# Patient Record
Sex: MALE | Race: WHITE | NOT HISPANIC OR LATINO | Employment: OTHER | URBAN - NONMETROPOLITAN AREA
[De-identification: names, ages, dates, MRNs, and addresses within clinical notes are randomized per-mention and may not be internally consistent; named-entity substitution may affect disease eponyms.]

---

## 2017-09-14 ENCOUNTER — OFFICE VISIT (OUTPATIENT)
Dept: RETAIL CLINIC | Facility: CLINIC | Age: 82
End: 2017-09-14

## 2017-09-14 DIAGNOSIS — Z23 NEED FOR VACCINATION: Primary | ICD-10-CM

## 2018-09-17 ENCOUNTER — TRANSCRIBE ORDERS (OUTPATIENT)
Dept: ADMINISTRATIVE | Facility: HOSPITAL | Age: 83
End: 2018-09-17

## 2018-09-17 DIAGNOSIS — I48.20 CHRONIC ATRIAL FIBRILLATION (HCC): Primary | ICD-10-CM

## 2018-09-18 ENCOUNTER — HOSPITAL ENCOUNTER (OUTPATIENT)
Dept: CARDIOLOGY | Facility: HOSPITAL | Age: 83
Discharge: HOME | End: 2018-09-18
Admitting: NURSE PRACTITIONER

## 2018-09-18 DIAGNOSIS — I48.20 CHRONIC ATRIAL FIBRILLATION (HCC): ICD-10-CM

## 2018-09-18 PROCEDURE — 93226 XTRNL ECG REC<48 HR SCAN A/R: CPT

## 2018-09-18 PROCEDURE — 93225 XTRNL ECG REC<48 HRS REC: CPT

## 2018-09-25 PROCEDURE — 93227 XTRNL ECG REC<48 HR R&I: CPT | Performed by: INTERNAL MEDICINE

## 2018-10-17 RX ORDER — GLIPIZIDE 5 MG/1
5 TABLET ORAL
COMMUNITY

## 2018-10-17 RX ORDER — LEVOTHYROXINE SODIUM 0.05 MG/1
50 TABLET ORAL DAILY
COMMUNITY

## 2018-10-18 ENCOUNTER — OFFICE VISIT (OUTPATIENT)
Dept: CARDIOLOGY | Facility: CLINIC | Age: 83
End: 2018-10-18

## 2018-10-18 VITALS
DIASTOLIC BLOOD PRESSURE: 84 MMHG | OXYGEN SATURATION: 80 % | BODY MASS INDEX: 26.16 KG/M2 | HEART RATE: 61 BPM | HEIGHT: 65 IN | WEIGHT: 157 LBS | SYSTOLIC BLOOD PRESSURE: 130 MMHG

## 2018-10-18 DIAGNOSIS — I48.0 PAROXYSMAL ATRIAL FIBRILLATION (HCC): Primary | ICD-10-CM

## 2018-10-18 DIAGNOSIS — R06.09 DOE (DYSPNEA ON EXERTION): ICD-10-CM

## 2018-10-18 DIAGNOSIS — E07.9 DISEASE OF THYROID GLAND: ICD-10-CM

## 2018-10-18 PROBLEM — I48.91 ATRIAL FIBRILLATION (HCC): Status: ACTIVE | Noted: 2018-10-18

## 2018-10-18 PROCEDURE — 99204 OFFICE O/P NEW MOD 45 MIN: CPT | Performed by: INTERNAL MEDICINE

## 2018-10-18 PROCEDURE — 93000 ELECTROCARDIOGRAM COMPLETE: CPT | Performed by: INTERNAL MEDICINE

## 2018-10-18 RX ORDER — FERROUS SULFATE TAB EC 324 MG (65 MG FE EQUIVALENT) 324 (65 FE) MG
324 TABLET DELAYED RESPONSE ORAL
COMMUNITY

## 2018-10-18 RX ORDER — ASPIRIN 81 MG/1
81 TABLET ORAL DAILY
COMMUNITY
End: 2018-10-18

## 2018-10-18 RX ORDER — THIAMINE HCL 100 MG
2500 TABLET ORAL DAILY
COMMUNITY

## 2018-10-18 NOTE — PROGRESS NOTES
Subjective:     Encounter Date:10/18/2018      Patient ID: Hunter Chen is a 88 y.o. male history of recently diagnosed atrial fibrillation, type II diabetes mellitus, hypothyroidism, hyperlipidemia is referred for further evaluation of atrial fibrillation.    Referring Provider: Pablo Pemberton APRN    Reason for Referral: Atrial fibrillation    Chief Complaint: Shortness of breath    Atrial Fibrillation   Presents for initial visit. Onset time: recently diagnosed. Symptoms include shortness of breath. Symptoms are negative for chest pain, dizziness, palpitations, syncope, tachycardia and weakness. The symptoms have been stable. Past treatments include nothing. Past medical history includes atrial fibrillation and hyperlipidemia. There is no history of CABG/stent, CAD, CHF, DVT, HTN and valvular heart disease.   Shortness of Breath   This is a new problem. The current episode started more than 1 month ago. The problem occurs daily. The problem has been waxing and waning. Pertinent negatives include no abdominal pain, chest pain, claudication, fever, headaches, hemoptysis, leg swelling, neck pain, orthopnea, PND, rash, syncope, vomiting or wheezing. The symptoms are aggravated by exercise. The patient has no known risk factors for DVT/PE. He has tried nothing for the symptoms. There is no history of CAD, chronic lung disease, COPD, DVT, a heart failure, PE, pneumonia or a recent surgery.        This is an 88-year-old male who presents for further evaluation of atrial fibrillation.  This was recently diagnosed by his primary care provider.  The patient says that for quite some time he has been somewhat short of breath with exertion.  When he discussed this with his primary care physician, an EKG was performed and showed the patient to be in atrial fibrillation.  He denies any palpitations, lightheadedness, dizziness, syncope, chest pain.  He has never been known to have any cardiac arrhythmias in the past.  He  says that overall he has been feeling reasonably well with the exception of what he describes as mild to moderate shortness of breath with exertion.  He denies orthopnea, PND, edema, cough, wheezing.  His weight has been stable.  He has never been known to have any type of valvular heart disease although he says he cannot recall ever having an echocardiogram performed.  The patient has not, to his knowledge had recent check of his thyroid function although he does take Synthroid on a regular basis.  He says that his diabetes is under reasonable control.  His blood pressure generally is well-controlled as well he does not require medications for this.  He has not had any significant bleeding difficulties in the past and has not fallen down at any point recently.    The following portions of the patient's history were reviewed and updated as appropriate: allergies, current medications, past family history, past medical history, past social history, past surgical history and problem list.     Past Medical History:   Diagnosis Date   • Atrial fibrillation (CMS/HCC)    • Diabetes mellitus (CMS/HCC)    • Disease of thyroid gland     HYPOTHYROIDISM   • Hyperlipidemia      Past Surgical History:   Procedure Laterality Date   • CYSTOSCOPY, STENT INSERTION, NEPHROURETERECTOMY         Current Outpatient Prescriptions:   •  ferrous sulfate 324 (65 Fe) MG tablet delayed-release EC tablet, Take 324 mg by mouth Daily With Breakfast., Disp: , Rfl:   •  glipiZIDE (GLUCOTROL) 5 MG tablet, Take 5 mg by mouth 3 (Three) Times a Day Before Meals., Disp: , Rfl:   •  levothyroxine (SYNTHROID, LEVOTHROID) 50 MCG tablet, Take 50 mcg by mouth Daily., Disp: , Rfl:   •  NIACIN CR PO, Take 1 tablet by mouth Daily., Disp: , Rfl:   •  vitamin B-12 (CYANOCOBALAMIN) 2500 MCG sublingual tablet tablet, Place 2,500 mcg under the tongue Daily., Disp: , Rfl:   •  apixaban (ELIQUIS) 2.5 MG tablet tablet, Take 1 tablet by mouth Every 12 (Twelve) Hours.,  Disp: 60 tablet, Rfl: 11    No Known Allergies    Social History   Substance Use Topics   • Smoking status: Former Smoker     Quit date: 1962   • Smokeless tobacco: Never Used      Comment: QUIT 50 YRS AGO   • Alcohol use No     Family History   Problem Relation Age of Onset   • No Known Problems Mother    • No Known Problems Father    • No Known Problems Sister    • No Known Problems Brother    • No Known Problems Brother    • No Known Problems Brother    • No Known Problems Brother    • No Known Problems Brother      Review of Systems   Constitution: Negative for chills, fever, weakness, night sweats and weight loss.   HENT: Negative for congestion and hearing loss.    Eyes: Negative for blurred vision and pain.   Cardiovascular: Positive for dyspnea on exertion. Negative for chest pain, claudication, irregular heartbeat, leg swelling, orthopnea, palpitations, paroxysmal nocturnal dyspnea and syncope.   Respiratory: Positive for shortness of breath. Negative for cough, hemoptysis and wheezing.    Endocrine: Negative for cold intolerance, heat intolerance, polydipsia and polyuria.   Hematologic/Lymphatic: Negative for adenopathy and bleeding problem. Does not bruise/bleed easily.   Skin: Negative for color change, poor wound healing and rash.   Musculoskeletal: Negative for arthritis, back pain, joint pain, joint swelling, myalgias and neck pain.   Gastrointestinal: Negative for abdominal pain, change in bowel habit, constipation, diarrhea, heartburn, hematochezia, melena, nausea and vomiting.   Genitourinary: Negative for bladder incontinence, dysuria, frequency, hematuria and nocturia.   Neurological: Negative for dizziness, focal weakness, headaches, light-headedness, loss of balance, numbness and seizures.   Psychiatric/Behavioral: Negative for altered mental status, memory loss and substance abuse.   Allergic/Immunologic: Negative for hives and persistent infections.         ECG 12 Lead  Date/Time: 10/18/2018  8:28 AM  Performed by: BRUCE SEXTON  Authorized by: BRUCE SEXTON   Comparison: compared with previous ECG from 9/17/2018  Similar to previous ECG  Rhythm: atrial fibrillation  Ectopy: PVCs  Rate: tachycardic  BPM: 114  Conduction: right bundle branch block  QRS axis: left  Clinical impression: abnormal ECG and dysrhythmia - atrial               Objective:     Physical Exam   Constitutional: He is oriented to person, place, and time. Vital signs are normal. He appears well-developed and well-nourished. He is cooperative.  Non-toxic appearance. No distress.   HENT:   Head: Normocephalic and atraumatic.   Right Ear: External ear normal.   Left Ear: External ear normal.   Nose: Nose normal.   Mouth/Throat: Uvula is midline, oropharynx is clear and moist and mucous membranes are normal. Mucous membranes are not pale, not dry and not cyanotic. No oropharyngeal exudate.   Eyes: Pupils are equal, round, and reactive to light. EOM and lids are normal.   Neck: Normal range of motion. Neck supple. No hepatojugular reflux and no JVD present. Carotid bruit is not present. No tracheal deviation and no edema present. No thyroid mass and no thyromegaly present.   Cardiovascular: Normal rate, S1 normal, S2 normal, normal heart sounds, intact distal pulses and normal pulses.  An irregularly irregular rhythm present.  No extrasystoles are present. PMI is not displaced.  Exam reveals no gallop and no friction rub.    No murmur heard.  Pulses:       Radial pulses are 2+ on the right side, and 2+ on the left side.        Femoral pulses are 2+ on the right side, and 2+ on the left side.       Dorsalis pedis pulses are 2+ on the right side, and 2+ on the left side.        Posterior tibial pulses are 2+ on the right side, and 2+ on the left side.   Pulmonary/Chest: Effort normal and breath sounds normal. No accessory muscle usage. No respiratory distress. He has no wheezes. He has no rales. He exhibits no tenderness.  "  Abdominal: Soft. Normal appearance and bowel sounds are normal. He exhibits no distension, no abdominal bruit and no pulsatile midline mass. There is no hepatosplenomegaly. There is no tenderness.   Musculoskeletal: Normal range of motion. He exhibits no edema, tenderness or deformity.   Lymphadenopathy:     He has no cervical adenopathy.   Neurological: He is oriented to person, place, and time. He has normal strength. No cranial nerve deficit.   Skin: Skin is warm, dry and intact. No rash noted. He is not diaphoretic. No cyanosis or erythema. Nails show no clubbing.   Psychiatric: He has a normal mood and affect. His speech is normal and behavior is normal. Thought content normal.   Vitals reviewed.    /84 (BP Location: Left arm, Patient Position: Sitting)   Pulse 61   Ht 165.1 cm (65\")   Wt 71.2 kg (157 lb)   SpO2 (!) 80%   BMI 26.13 kg/m²     Lab Review:     Holter 9/18/18:  · An abnormal monitor study.  · The predominant rhythm noted during the testing period was atrial fibrillation.  · Average HR: 92. Min HR: 59. Max HR: 154.  · Premature ventricular contractions occured rarely.  · No symptoms reported during the monitoring period.      Assessment:          Diagnosis Plan   1. Paroxysmal atrial fibrillation (CMS/HCC)  ECG 12 Lead    Adult Transthoracic Echo Complete W/ Cont if Necessary Per Protocol    Basic Metabolic Panel    Cardioversion External in Cardiology Department    apixaban (ELIQUIS) 2.5 MG tablet tablet   2. Disease of thyroid gland  TSH   3. UNGER (dyspnea on exertion)            Plan:       1.  Paroxysmal atrial fibrillation: This was recently diagnosed.  Presumably this is paroxysmal in nature but probably has been present for least a month as the patient has been experiencing some shortness of breath that was new onset for him over the past month or so.  His EKG today did show atrial fibrillation that is rate controlled.  I will check an echocardiogram to assure no significant " valvular abnormalities.  In addition, we will place the patient on Eliquis at 2.5 mg twice daily.  He recently had a creatinine level of 1.17 at our institution but has not had any recent checks.  It may be that his renal function has normalized and he may require a 5 mg dose but we will make further plans after his basic metabolic panel returns.  I will also go and schedule a cardioversion in approximately 4 weeks more we will try to restore sinus rhythm for this patient.  Afterwards, we will readdress shortness of breath.  Presumably, shortness of breath is related to his atrial fibrillation.    2.  Acquired hypothyroidism: The patient tells me that he is not sure but he does not think that he has had recent check of his thyroid function therefore I will check a TSH level today to see where he is in this regard.  Continue Synthroid at this time.    3.  Shortness of breath: As stated above, presumably the patient shortness of breath is related to his atrial fibrillation.  We will try to restore sinus rhythm and readdress his shortness of breath thereafter worse.    Patient's Body mass index is 26.13 kg/m². BMI is above normal parameters. Recommendations include: exercise counseling and nutrition counseling.    Follow-up: pending above

## 2018-10-25 ENCOUNTER — HOSPITAL ENCOUNTER (OUTPATIENT)
Dept: CARDIOLOGY | Facility: HOSPITAL | Age: 83
Discharge: HOME | End: 2018-10-25
Attending: INTERNAL MEDICINE | Admitting: INTERNAL MEDICINE

## 2018-10-25 VITALS
WEIGHT: 156.97 LBS | BODY MASS INDEX: 26.15 KG/M2 | DIASTOLIC BLOOD PRESSURE: 85 MMHG | HEIGHT: 65 IN | SYSTOLIC BLOOD PRESSURE: 149 MMHG

## 2018-10-25 DIAGNOSIS — I48.0 PAROXYSMAL ATRIAL FIBRILLATION (HCC): ICD-10-CM

## 2018-10-25 PROCEDURE — 93306 TTE W/DOPPLER COMPLETE: CPT | Performed by: INTERNAL MEDICINE

## 2018-10-25 PROCEDURE — 93306 TTE W/DOPPLER COMPLETE: CPT

## 2018-10-25 PROCEDURE — 25010000002 PERFLUTREN 6.52 MG/ML SUSPENSION: Performed by: INTERNAL MEDICINE

## 2018-10-25 RX ADMIN — PERFLUTREN 8.48 MG: 6.52 INJECTION, SUSPENSION INTRAVENOUS at 13:23

## 2018-10-29 LAB
BH CV ECHO MEAS - AO MAX PG (FULL): 1 MMHG
BH CV ECHO MEAS - AO MAX PG: 3.3 MMHG
BH CV ECHO MEAS - AO MEAN PG (FULL): 0 MMHG
BH CV ECHO MEAS - AO MEAN PG: 2 MMHG
BH CV ECHO MEAS - AO ROOT AREA (BSA CORRECTED): 1.7
BH CV ECHO MEAS - AO ROOT AREA: 7.1 CM^2
BH CV ECHO MEAS - AO ROOT DIAM: 3 CM
BH CV ECHO MEAS - AO V2 MAX: 91.5 CM/SEC
BH CV ECHO MEAS - AO V2 MEAN: 68.4 CM/SEC
BH CV ECHO MEAS - AO V2 VTI: 17.9 CM
BH CV ECHO MEAS - AVA(I,A): 2.9 CM^2
BH CV ECHO MEAS - AVA(I,D): 2.9 CM^2
BH CV ECHO MEAS - AVA(V,A): 2.6 CM^2
BH CV ECHO MEAS - AVA(V,D): 2.6 CM^2
BH CV ECHO MEAS - BSA(HAYCOCK): 1.8 M^2
BH CV ECHO MEAS - BSA: 1.8 M^2
BH CV ECHO MEAS - BZI_BMI: 26 KILOGRAMS/M^2
BH CV ECHO MEAS - BZI_METRIC_HEIGHT: 165.1 CM
BH CV ECHO MEAS - BZI_METRIC_WEIGHT: 70.8 KG
BH CV ECHO MEAS - EDV(CUBED): 132.7 ML
BH CV ECHO MEAS - EDV(MOD-SP4): 91.2 ML
BH CV ECHO MEAS - EDV(TEICH): 123.8 ML
BH CV ECHO MEAS - EF(CUBED): 55.3 %
BH CV ECHO MEAS - EF(MOD-SP4): 52.9 %
BH CV ECHO MEAS - EF(TEICH): 46.8 %
BH CV ECHO MEAS - ESV(CUBED): 59.3 ML
BH CV ECHO MEAS - ESV(MOD-SP4): 43 ML
BH CV ECHO MEAS - ESV(TEICH): 65.9 ML
BH CV ECHO MEAS - FS: 23.5 %
BH CV ECHO MEAS - IVS/LVPW: 0.91
BH CV ECHO MEAS - IVSD: 1 CM
BH CV ECHO MEAS - LA DIMENSION: 4.5 CM
BH CV ECHO MEAS - LA/AO: 1.5
BH CV ECHO MEAS - LAT PEAK E' VEL: 14.1 CM/SEC
BH CV ECHO MEAS - LV DIASTOLIC VOL/BSA (35-75): 51.2 ML/M^2
BH CV ECHO MEAS - LV MASS(C)D: 200.8 GRAMS
BH CV ECHO MEAS - LV MASS(C)DI: 112.8 GRAMS/M^2
BH CV ECHO MEAS - LV MAX PG: 2.3 MMHG
BH CV ECHO MEAS - LV MEAN PG: 2 MMHG
BH CV ECHO MEAS - LV SYSTOLIC VOL/BSA (12-30): 24.2 ML/M^2
BH CV ECHO MEAS - LV V1 MAX: 75.6 CM/SEC
BH CV ECHO MEAS - LV V1 MEAN: 61.4 CM/SEC
BH CV ECHO MEAS - LV V1 VTI: 16.8 CM
BH CV ECHO MEAS - LVIDD: 5.1 CM
BH CV ECHO MEAS - LVIDS: 3.9 CM
BH CV ECHO MEAS - LVLD AP4: 7.3 CM
BH CV ECHO MEAS - LVLS AP4: 6.1 CM
BH CV ECHO MEAS - LVOT AREA (M): 3.1 CM^2
BH CV ECHO MEAS - LVOT AREA: 3.1 CM^2
BH CV ECHO MEAS - LVOT DIAM: 2 CM
BH CV ECHO MEAS - LVPWD: 1.1 CM
BH CV ECHO MEAS - MED PEAK E' VEL: 23.1 CM/SEC
BH CV ECHO MEAS - MV A MAX VEL: 32.8 CM/SEC
BH CV ECHO MEAS - MV DEC TIME: 0.19 SEC
BH CV ECHO MEAS - MV E MAX VEL: 95.2 CM/SEC
BH CV ECHO MEAS - MV E/A: 2.9
BH CV ECHO MEAS - RAP SYSTOLE: 5 MMHG
BH CV ECHO MEAS - RVSP: 52.1 MMHG
BH CV ECHO MEAS - SI(AO): 71.1 ML/M^2
BH CV ECHO MEAS - SI(CUBED): 41.2 ML/M^2
BH CV ECHO MEAS - SI(LVOT): 29.7 ML/M^2
BH CV ECHO MEAS - SI(MOD-SP4): 27.1 ML/M^2
BH CV ECHO MEAS - SI(TEICH): 32.5 ML/M^2
BH CV ECHO MEAS - SV(AO): 126.5 ML
BH CV ECHO MEAS - SV(CUBED): 73.3 ML
BH CV ECHO MEAS - SV(LVOT): 52.8 ML
BH CV ECHO MEAS - SV(MOD-SP4): 48.2 ML
BH CV ECHO MEAS - SV(TEICH): 57.9 ML
BH CV ECHO MEAS - TR MAX VEL: 343 CM/SEC
BH CV ECHO MEASUREMENTS AVERAGE E/E' RATIO: 5.12
LEFT ATRIUM VOLUME INDEX: 35.1 ML/M2
LEFT ATRIUM VOLUME: 62.5 CM3

## 2018-11-19 ENCOUNTER — HOSPITAL ENCOUNTER (OUTPATIENT)
Dept: CARDIOLOGY | Facility: HOSPITAL | Age: 83
Discharge: HOME | End: 2018-11-19
Attending: INTERNAL MEDICINE | Admitting: INTERNAL MEDICINE

## 2018-11-19 ENCOUNTER — ANESTHESIA (OUTPATIENT)
Dept: CARDIOLOGY | Facility: HOSPITAL | Age: 83
End: 2018-11-19

## 2018-11-19 ENCOUNTER — ANESTHESIA EVENT (OUTPATIENT)
Dept: CARDIOLOGY | Facility: HOSPITAL | Age: 83
End: 2018-11-19

## 2018-11-19 VITALS
OXYGEN SATURATION: 97 % | RESPIRATION RATE: 20 BRPM | BODY MASS INDEX: 25.33 KG/M2 | WEIGHT: 152 LBS | SYSTOLIC BLOOD PRESSURE: 151 MMHG | HEIGHT: 65 IN | DIASTOLIC BLOOD PRESSURE: 98 MMHG | HEART RATE: 86 BPM

## 2018-11-19 VITALS — DIASTOLIC BLOOD PRESSURE: 80 MMHG | SYSTOLIC BLOOD PRESSURE: 121 MMHG | OXYGEN SATURATION: 99 %

## 2018-11-19 DIAGNOSIS — I48.0 PAROXYSMAL ATRIAL FIBRILLATION (HCC): ICD-10-CM

## 2018-11-19 PROCEDURE — 93005 ELECTROCARDIOGRAM TRACING: CPT | Performed by: INTERNAL MEDICINE

## 2018-11-19 PROCEDURE — 25010000002 PROPOFOL 10 MG/ML EMULSION: Performed by: NURSE ANESTHETIST, CERTIFIED REGISTERED

## 2018-11-19 PROCEDURE — 92960 CARDIOVERSION ELECTRIC EXT: CPT | Performed by: INTERNAL MEDICINE

## 2018-11-19 PROCEDURE — 93010 ELECTROCARDIOGRAM REPORT: CPT | Performed by: INTERNAL MEDICINE

## 2018-11-19 PROCEDURE — 92960 CARDIOVERSION ELECTRIC EXT: CPT

## 2018-11-19 RX ORDER — SODIUM CHLORIDE 9 MG/ML
100 INJECTION, SOLUTION INTRAVENOUS CONTINUOUS
Status: DISCONTINUED | OUTPATIENT
Start: 2018-11-19 | End: 2018-11-20 | Stop reason: HOSPADM

## 2018-11-19 RX ORDER — PROPOFOL 10 MG/ML
VIAL (ML) INTRAVENOUS AS NEEDED
Status: DISCONTINUED | OUTPATIENT
Start: 2018-11-19 | End: 2018-11-19 | Stop reason: SURG

## 2018-11-19 RX ORDER — LIDOCAINE HYDROCHLORIDE 20 MG/ML
INJECTION, SOLUTION INFILTRATION; PERINEURAL AS NEEDED
Status: DISCONTINUED | OUTPATIENT
Start: 2018-11-19 | End: 2018-11-19 | Stop reason: SURG

## 2018-11-19 RX ORDER — SODIUM CHLORIDE 0.9 % (FLUSH) 0.9 %
3 SYRINGE (ML) INJECTION EVERY 12 HOURS SCHEDULED
Status: CANCELLED | OUTPATIENT
Start: 2018-11-19

## 2018-11-19 RX ORDER — SODIUM CHLORIDE 0.9 % (FLUSH) 0.9 %
3-10 SYRINGE (ML) INJECTION AS NEEDED
Status: CANCELLED | OUTPATIENT
Start: 2018-11-19

## 2018-11-19 RX ADMIN — PROPOFOL 50 MG: 10 INJECTION, EMULSION INTRAVENOUS at 08:21

## 2018-11-19 RX ADMIN — LIDOCAINE HYDROCHLORIDE 50 MG: 20 INJECTION, SOLUTION INFILTRATION; PERINEURAL at 08:21

## 2018-11-19 RX ADMIN — SODIUM CHLORIDE 100 ML/HR: 9 INJECTION, SOLUTION INTRAVENOUS at 07:20

## 2018-11-19 NOTE — ANESTHESIA PREPROCEDURE EVALUATION
Anesthesia Evaluation     Patient summary reviewed and Nursing notes reviewed   no history of anesthetic complications:  NPO Solid Status: > 8 hours  NPO Liquid Status: > 8 hours           Airway   Mallampati: II  No difficulty expected  Dental    (+) lower dentures and upper dentures    Pulmonary - negative pulmonary ROS   Cardiovascular   Exercise tolerance: poor (<4 METS) (Denies chest pain with exertion.  )    ECG reviewed  PT is on anticoagulation therapy    (+) dysrhythmias Atrial Fib, hyperlipidemia,     ROS comment: 10/25/218 Echo    · Left ventricular systolic function is mildly decreased. Estimated EF appears to be in the range of 46 - 50%.  · Left ventricular diastolic dysfunction.  · Mild mitral valve regurgitation is present  · Moderate tricuspid valve regurgitation is present. Estimated right ventricular systolic pressure from tricuspid regurgitation is moderately elevated (45-55 mmHg).    Neuro/Psych- negative ROS  GI/Hepatic/Renal/Endo    (+)   diabetes mellitus,     Musculoskeletal (-) negative ROS    Abdominal    Substance History - negative use     OB/GYN negative ob/gyn ROS         Other                        Anesthesia Plan    ASA 3     general     intravenous induction   Anesthetic plan, all risks, benefits, and alternatives have been provided, discussed and informed consent has been obtained with: patient.

## 2018-11-19 NOTE — ANESTHESIA POSTPROCEDURE EVALUATION
Patient: Hunter Chen    Procedure Summary     Date:  11/19/18 Room / Location:  [facility] CLOSE OBSERVATION UNIT    Anesthesia Start:  0817 Anesthesia Stop:  0831    Procedure:  CARDIOVERSION EXTERNAL IN CARDIOLOGY DEPARTMENT Diagnosis:       Paroxysmal atrial fibrillation (CMS/HCC)      (PAF)    Scheduled Providers:  Pablo Ulrich MD Provider:  Darien Hedrick CRNA    Anesthesia Type:  general ASA Status:  3          Anesthesia Type: general  Last vitals  BP   120/81 (11/19/18 0828)   Temp       Pulse   89 (11/19/18 0828)   Resp   20 (11/19/18 0828)     SpO2   96 % (11/19/18 0828)     Post Anesthesia Care and Evaluation    Patient location during evaluation: PHASE II  Patient participation: complete - patient participated  Level of consciousness: awake  Pain management: adequate  Airway patency: patent  Anesthetic complications: No anesthetic complications  Respiratory status: acceptable  Hydration status: acceptable

## 2018-11-21 ENCOUNTER — HOSPITAL ENCOUNTER (EMERGENCY)
Facility: HOSPITAL | Age: 83
Discharge: ANOTHER HOSPITAL | End: 2018-11-21
Attending: EMERGENCY MEDICINE | Admitting: EMERGENCY MEDICINE

## 2018-11-21 ENCOUNTER — APPOINTMENT (OUTPATIENT)
Dept: CT IMAGING | Facility: HOSPITAL | Age: 83
End: 2018-11-21

## 2018-11-21 ENCOUNTER — APPOINTMENT (OUTPATIENT)
Dept: NEUROLOGY | Facility: HOSPITAL | Age: 83
End: 2018-11-21
Attending: EMERGENCY MEDICINE

## 2018-11-21 ENCOUNTER — APPOINTMENT (OUTPATIENT)
Dept: GENERAL RADIOLOGY | Facility: HOSPITAL | Age: 83
End: 2018-11-21

## 2018-11-21 VITALS
OXYGEN SATURATION: 100 % | DIASTOLIC BLOOD PRESSURE: 72 MMHG | SYSTOLIC BLOOD PRESSURE: 128 MMHG | RESPIRATION RATE: 14 BRPM | BODY MASS INDEX: 26.49 KG/M2 | HEART RATE: 90 BPM | HEIGHT: 64 IN | WEIGHT: 155.19 LBS | TEMPERATURE: 97.9 F

## 2018-11-21 DIAGNOSIS — S00.03XA CONTUSION OF SCALP, INITIAL ENCOUNTER: ICD-10-CM

## 2018-11-21 DIAGNOSIS — I63.511 CEREBROVASCULAR ACCIDENT (CVA) DUE TO OCCLUSION OF RIGHT MIDDLE CEREBRAL ARTERY (HCC): Primary | ICD-10-CM

## 2018-11-21 DIAGNOSIS — R77.8 TROPONIN LEVEL ELEVATED: ICD-10-CM

## 2018-11-21 DIAGNOSIS — R41.82 ALTERED MENTAL STATUS, UNSPECIFIED ALTERED MENTAL STATUS TYPE: ICD-10-CM

## 2018-11-21 DIAGNOSIS — N17.9 AKI (ACUTE KIDNEY INJURY) (HCC): ICD-10-CM

## 2018-11-21 LAB
ABO GROUP BLD: NORMAL
ALBUMIN SERPL-MCNC: 4.4 G/DL (ref 3.5–5)
ALBUMIN/GLOB SERPL: 1.3 G/DL (ref 1.1–2.5)
ALP SERPL-CCNC: 79 U/L (ref 24–120)
ALT SERPL W P-5'-P-CCNC: 19 U/L (ref 0–54)
ANION GAP SERPL CALCULATED.3IONS-SCNC: 13 MMOL/L (ref 4–13)
APTT PPP: 34.4 SECONDS (ref 24.1–34.8)
ARTERIAL PATENCY WRIST A: POSITIVE
AST SERPL-CCNC: 29 U/L (ref 7–45)
ATMOSPHERIC PRESS: 760 MMHG
BASE EXCESS BLDA CALC-SCNC: -3.3 MMOL/L (ref 0–2)
BASOPHILS # BLD AUTO: 0.03 10*3/MM3 (ref 0–0.2)
BASOPHILS NFR BLD AUTO: 0.6 % (ref 0–2)
BDY SITE: ABNORMAL
BILIRUB SERPL-MCNC: 0.7 MG/DL (ref 0.1–1)
BLD GP AB SCN SERPL QL: NEGATIVE
BODY TEMPERATURE: 37 C
BUN BLD-MCNC: 14 MG/DL (ref 5–21)
BUN/CREAT SERPL: 8.4 (ref 7–25)
CALCIUM SPEC-SCNC: 9.4 MG/DL (ref 8.4–10.4)
CHLORIDE SERPL-SCNC: 103 MMOL/L (ref 98–110)
CO2 SERPL-SCNC: 27 MMOL/L (ref 24–31)
CREAT BLD-MCNC: 1.67 MG/DL (ref 0.5–1.4)
DEPRECATED RDW RBC AUTO: 66.4 FL (ref 40–54)
EOSINOPHIL # BLD AUTO: 0.19 10*3/MM3 (ref 0–0.7)
EOSINOPHIL NFR BLD AUTO: 4.1 % (ref 0–4)
ERYTHROCYTE [DISTWIDTH] IN BLOOD BY AUTOMATED COUNT: 21.7 % (ref 12–15)
GFR SERPL CREATININE-BSD FRML MDRD: 39 ML/MIN/1.73
GLOBULIN UR ELPH-MCNC: 3.3 GM/DL
GLUCOSE BLD-MCNC: 157 MG/DL (ref 70–100)
GLUCOSE BLDC GLUCOMTR-MCNC: 168 MG/DL (ref 70–130)
HCO3 BLDA-SCNC: 22.9 MMOL/L (ref 20–26)
HCT VFR BLD AUTO: 48 % (ref 40–52)
HGB BLD-MCNC: 14.2 G/DL (ref 14–18)
HOLD SPECIMEN: NORMAL
HOLD SPECIMEN: NORMAL
HOROWITZ INDEX BLD+IHG-RTO: 60 %
IMM GRANULOCYTES # BLD: 0.02 10*3/MM3 (ref 0–0.03)
IMM GRANULOCYTES NFR BLD: 0.4 % (ref 0–5)
INR PPP: 1.11 (ref 0.91–1.09)
LYMPHOCYTES # BLD AUTO: 1.32 10*3/MM3 (ref 0.72–4.86)
LYMPHOCYTES NFR BLD AUTO: 28.6 % (ref 15–45)
Lab: ABNORMAL
MCH RBC QN AUTO: 25.2 PG (ref 28–32)
MCHC RBC AUTO-ENTMCNC: 29.6 G/DL (ref 33–36)
MCV RBC AUTO: 85.1 FL (ref 82–95)
MODALITY: ABNORMAL
MONOCYTES # BLD AUTO: 0.32 10*3/MM3 (ref 0.19–1.3)
MONOCYTES NFR BLD AUTO: 6.9 % (ref 4–12)
NEUTROPHILS # BLD AUTO: 2.74 10*3/MM3 (ref 1.87–8.4)
NEUTROPHILS NFR BLD AUTO: 59.4 % (ref 39–78)
NRBC BLD MANUAL-RTO: 0 /100 WBC (ref 0–0)
PCO2 BLDA: 44.2 MM HG (ref 35–45)
PEEP RESPIRATORY: 5 CM[H2O]
PH BLDA: 7.32 PH UNITS (ref 7.35–7.45)
PLATELET # BLD AUTO: 112 10*3/MM3 (ref 130–400)
PMV BLD AUTO: 9.7 FL (ref 6–12)
PO2 BLDA: 223 MM HG (ref 83–108)
POTASSIUM BLD-SCNC: 4.1 MMOL/L (ref 3.5–5.3)
PROT SERPL-MCNC: 7.7 G/DL (ref 6.3–8.7)
PROTHROMBIN TIME: 14.7 SECONDS (ref 11.9–14.6)
RBC # BLD AUTO: 5.64 10*6/MM3 (ref 4.8–5.9)
RH BLD: POSITIVE
SAO2 % BLDCOA: 100 % (ref 94–99)
SET MECH RESP RATE: 10
SODIUM BLD-SCNC: 143 MMOL/L (ref 135–145)
T&S EXPIRATION DATE: NORMAL
TROPONIN I SERPL-MCNC: 0.04 NG/ML (ref 0–0.03)
VENTILATOR MODE: AC
VT ON VENT VENT: 700 ML
WBC NRBC COR # BLD: 4.62 10*3/MM3 (ref 4.8–10.8)
WHOLE BLOOD HOLD SPECIMEN: NORMAL
WHOLE BLOOD HOLD SPECIMEN: NORMAL

## 2018-11-21 PROCEDURE — 80053 COMPREHEN METABOLIC PANEL: CPT | Performed by: EMERGENCY MEDICINE

## 2018-11-21 PROCEDURE — 70496 CT ANGIOGRAPHY HEAD: CPT

## 2018-11-21 PROCEDURE — 70498 CT ANGIOGRAPHY NECK: CPT

## 2018-11-21 PROCEDURE — 99291 CRITICAL CARE FIRST HOUR: CPT

## 2018-11-21 PROCEDURE — 0 IOPAMIDOL PER 1 ML: Performed by: EMERGENCY MEDICINE

## 2018-11-21 PROCEDURE — 85025 COMPLETE CBC W/AUTO DIFF WBC: CPT | Performed by: EMERGENCY MEDICINE

## 2018-11-21 PROCEDURE — 25010000002 SUCCINYLCHOLINE PER 20 MG: Performed by: EMERGENCY MEDICINE

## 2018-11-21 PROCEDURE — 25010000002 PROPOFOL 1000 MG/ML EMULSION: Performed by: EMERGENCY MEDICINE

## 2018-11-21 PROCEDURE — 96376 TX/PRO/DX INJ SAME DRUG ADON: CPT

## 2018-11-21 PROCEDURE — 70486 CT MAXILLOFACIAL W/O DYE: CPT

## 2018-11-21 PROCEDURE — 72125 CT NECK SPINE W/O DYE: CPT

## 2018-11-21 PROCEDURE — 94799 UNLISTED PULMONARY SVC/PX: CPT

## 2018-11-21 PROCEDURE — 93010 ELECTROCARDIOGRAM REPORT: CPT | Performed by: INTERNAL MEDICINE

## 2018-11-21 PROCEDURE — 85610 PROTHROMBIN TIME: CPT | Performed by: EMERGENCY MEDICINE

## 2018-11-21 PROCEDURE — 25010000002 MIDAZOLAM PER 1 MG: Performed by: EMERGENCY MEDICINE

## 2018-11-21 PROCEDURE — 86850 RBC ANTIBODY SCREEN: CPT | Performed by: EMERGENCY MEDICINE

## 2018-11-21 PROCEDURE — 31500 INSERT EMERGENCY AIRWAY: CPT

## 2018-11-21 PROCEDURE — 82962 GLUCOSE BLOOD TEST: CPT

## 2018-11-21 PROCEDURE — 82803 BLOOD GASES ANY COMBINATION: CPT

## 2018-11-21 PROCEDURE — 86901 BLOOD TYPING SEROLOGIC RH(D): CPT | Performed by: EMERGENCY MEDICINE

## 2018-11-21 PROCEDURE — 96368 THER/DIAG CONCURRENT INF: CPT

## 2018-11-21 PROCEDURE — 84484 ASSAY OF TROPONIN QUANT: CPT | Performed by: EMERGENCY MEDICINE

## 2018-11-21 PROCEDURE — 96375 TX/PRO/DX INJ NEW DRUG ADDON: CPT

## 2018-11-21 PROCEDURE — 36600 WITHDRAWAL OF ARTERIAL BLOOD: CPT

## 2018-11-21 PROCEDURE — 70450 CT HEAD/BRAIN W/O DYE: CPT

## 2018-11-21 PROCEDURE — 25010000003 LEVETIRACETAM IN NACL 0.75% 1000 MG/100ML SOLUTION: Performed by: EMERGENCY MEDICINE

## 2018-11-21 PROCEDURE — 71045 X-RAY EXAM CHEST 1 VIEW: CPT

## 2018-11-21 PROCEDURE — 85730 THROMBOPLASTIN TIME PARTIAL: CPT | Performed by: EMERGENCY MEDICINE

## 2018-11-21 PROCEDURE — 96365 THER/PROPH/DIAG IV INF INIT: CPT

## 2018-11-21 PROCEDURE — 99285 EMERGENCY DEPT VISIT HI MDM: CPT

## 2018-11-21 PROCEDURE — 93005 ELECTROCARDIOGRAM TRACING: CPT | Performed by: EMERGENCY MEDICINE

## 2018-11-21 PROCEDURE — 95816 EEG AWAKE AND DROWSY: CPT | Performed by: PSYCHIATRY & NEUROLOGY

## 2018-11-21 PROCEDURE — 95816 EEG AWAKE AND DROWSY: CPT

## 2018-11-21 PROCEDURE — 86900 BLOOD TYPING SEROLOGIC ABO: CPT | Performed by: EMERGENCY MEDICINE

## 2018-11-21 PROCEDURE — 96366 THER/PROPH/DIAG IV INF ADDON: CPT

## 2018-11-21 PROCEDURE — 94002 VENT MGMT INPAT INIT DAY: CPT

## 2018-11-21 PROCEDURE — 99285 EMERGENCY DEPT VISIT HI MDM: CPT | Performed by: PSYCHIATRY & NEUROLOGY

## 2018-11-21 RX ORDER — SUCCINYLCHOLINE CHLORIDE 20 MG/ML
50 INJECTION INTRAMUSCULAR; INTRAVENOUS ONCE
Status: DISCONTINUED | OUTPATIENT
Start: 2018-11-21 | End: 2018-11-21

## 2018-11-21 RX ORDER — MIDAZOLAM HYDROCHLORIDE 1 MG/ML
2 INJECTION INTRAMUSCULAR; INTRAVENOUS ONCE
Status: COMPLETED | OUTPATIENT
Start: 2018-11-21 | End: 2018-11-21

## 2018-11-21 RX ORDER — SODIUM CHLORIDE 0.9 % (FLUSH) 0.9 %
10 SYRINGE (ML) INJECTION AS NEEDED
Status: DISCONTINUED | OUTPATIENT
Start: 2018-11-21 | End: 2018-11-21 | Stop reason: HOSPADM

## 2018-11-21 RX ORDER — LEVETIRACETAM 10 MG/ML
1000 INJECTION INTRAVASCULAR ONCE
Status: COMPLETED | OUTPATIENT
Start: 2018-11-21 | End: 2018-11-21

## 2018-11-21 RX ORDER — ETOMIDATE 2 MG/ML
50 INJECTION INTRAVENOUS ONCE
Status: COMPLETED | OUTPATIENT
Start: 2018-11-21 | End: 2018-11-21

## 2018-11-21 RX ORDER — SUCCINYLCHOLINE CHLORIDE 20 MG/ML
100 INJECTION INTRAMUSCULAR; INTRAVENOUS ONCE
Status: COMPLETED | OUTPATIENT
Start: 2018-11-21 | End: 2018-11-21

## 2018-11-21 RX ADMIN — PROPOFOL 5 MCG/KG/MIN: 10 INJECTION, EMULSION INTRAVENOUS at 09:40

## 2018-11-21 RX ADMIN — IOPAMIDOL 200 ML: 755 INJECTION, SOLUTION INTRAVENOUS at 10:24

## 2018-11-21 RX ADMIN — SUCCINYLCHOLINE CHLORIDE 100 MG: 20 INJECTION, SOLUTION INTRAMUSCULAR; INTRAVENOUS; PARENTERAL at 08:59

## 2018-11-21 RX ADMIN — MIDAZOLAM 2 MG: 1 INJECTION INTRAMUSCULAR; INTRAVENOUS at 09:29

## 2018-11-21 RX ADMIN — MIDAZOLAM 2 MG: 1 INJECTION INTRAMUSCULAR; INTRAVENOUS at 10:12

## 2018-11-21 RX ADMIN — LIDOCAINE HYDROCHLORIDE 100 MG: 20 INJECTION, SOLUTION INTRAVENOUS at 09:00

## 2018-11-21 RX ADMIN — LEVETIRACETAM 1000 MG: 10 INJECTION INTRAVENOUS at 09:43

## 2018-11-21 RX ADMIN — ETOMIDATE 50 MG: 2 INJECTION INTRAVENOUS at 08:59

## 2018-11-21 NOTE — CONSULTS
Neurology Consult Note    Patient:  Hunter Chen   YOB: 1930  MRN:  [id number]  Date of Admission:  11/21/2018  9:04 AM    Date: 11/21/2018    Referring Provider:  Shai Turk MD  Reason for Consultation: Stroke      History of present illness:     This is a 88 y.o. right handed male.with H/O atrial fibrillation on Eliquis  DM, hyperlipidemia, hypothyroid, evaluated for stroke.     Patient has a H/O atrial fibrillation and was cardioverted Monday November 19 and had been on Eliquis since diagnosis and remained on Eliquis so is not a TPA candidate. His last known well was last night at 10:30 PM. He had felt the best he had in a long time per family.  However this morning he did not wake up at his usual 6 AM time   Wife finally aroused him at 7:30 AM today and could not get him to respond and eventually he began to mumble.  Her adult children were there and she went and woke them up but they heard a thump and he had fallen out of bed hitting the left side of his head. Per family his eyes were deviated to the right and he was not moving his left side as well. He was brought to ED [facility] and ED provider intubated him when he became unresponsive and he ordered a STAT EEG and called Neuro .  Head CT showed a hyperdense sign of right MCA and CT showed some involvement of right insula and  CTA of head and neck was obtained showing occlusion of the left M1 segment.     Patient is back in Atrial fibrillation    Past Medical History:   Diagnosis Date   • Atrial fibrillation (CMS/HCC)    • Diabetes mellitus (CMS/HCC)    • Disease of thyroid gland     HYPOTHYROIDISM   • Hyperlipidemia        Past Surgical History:   Procedure Laterality Date   • CYSTOSCOPY, STENT INSERTION, NEPHROURETERECTOMY         Prior to Admission medications    Medication Sig Start Date End Date Taking? Authorizing Provider   apiarian (ELIQUIS) 2.5 MG tablet Take 1 tablet by mouth Every 12 (Twelve) Hours. ROMEO  4/2020  18  Yes Pablo Ulrich MD   ferrous sulfate 324 (65 Fe) MG tablet delayed-release EC tablet Take 324 mg by mouth Daily With Breakfast.   Yes ProviderDavidson MD   glipizide (GLUCOTROL) 5 MG tablet Take 5 mg by mouth 3 (Three) Times a Day Before Meals.   Yes ProviderDavidson MD   levothyroxine (SYNTHROID, LEVOTHROID) 50 MCG tablet Take 50 mcg by mouth Daily.   Yes ProviderDavidson MD   NIACIN CR PO Take 1 tablet by mouth Daily.   Yes ProviderDavidson MD   vitamin B-12 (CYANOCOBALAMIN) 2500 MCG sublingual tablet Place 2,500 mcg under the tongue Daily.   Yes ProviderDavidson MD   apiarian (ELIQUIS) 2.5 MG tablet tablet Take 1 tablet by mouth Every 12 (Twelve) Hours. 10/18/18   Pablo Ulrich MD       Hospital scheduled medications:      Hospital PRN medications:  sodium chloride    No Known Allergies    Social History     Socioeconomic History   • Marital status:      Spouse name: Not on file   • Number of children: Not on file   • Years of education: Not on file   • Highest education level: Not on file   Social Needs   • Financial resource strain: Not on file   • Food insecurity - worry: Not on file   • Food insecurity - inability: Not on file   • Transportation needs - medical: Not on file   • Transportation needs - non-medical: Not on file   Occupational History   • Not on file   Tobacco Use   • Smoking status: Former Smoker     Last attempt to quit:      Years since quittin.9   • Smokeless tobacco: Never Used   • Tobacco comment: QUIT 50 YRS AGO   Substance and Sexual Activity   • Alcohol use: No   • Drug use: No   • Sexual activity: Defer   Other Topics Concern   • Not on file   Social History Narrative   • Not on file     Family History   Problem Relation Age of Onset   • No Known Problems Mother    • No Known Problems Father    • No Known Problems Sister    • No Known Problems Brother    • No Known Problems Brother    • No Known Problems Brother     • No Known Problems Brother    • No Known Problems Brother        Review of Systems  A 14 point review of systems was unobtainable as he was intubated  Vital Signs   Temp:  [97.9 °F (36.6 °C)] 97.9 °F (36.6 °C)  Heart Rate:  [87-98] 95  Resp:  [14-15] 14  BP: (139-190)/() 155/77  FiO2 (%):  [60 %] 60 %    General Exam:  Head:  Normal cephalic, atraumatic  HEENT:  Neck supple  Fundoscopic Exam:  No signs of disc edema  CVS:  Regular rate and rhythm.  No murmurs  Carotid Examination:  No bruits  Lungs:  Clear to auscultation  Abdomen:  Non-tender, Non-distended  Extremities:  No signs of peripheral edema  Skin:  No rashes    Neurologic Exam:    Mental Status:    Intubated and on propofol which was held. He follows some commands such as squeezing hand but no others    CN II:  Pupils pinpoint equally reactive to light  CN III, IV, VI:  Extraocular Muscles full with no signs of nystagmus  CN V:  Facial sensory is symmetric   CN VII:  Facial motor likely asymmetric on the left  CN VIII:  Gross hearing intact bilaterally  CN IX/X:  Palate elevates symmetrically  CN XI:  Shoulder shrug symmetric  CN XII:  Tongue is midline on protrusion    Motor: (strength out of 5:  1= minimal movement, 2 = movement in plane of gravity, 3 = movement against gravity, 4 = movement against some resistance, 5 = full strength)    -Right Upper Ext: Spontaneously moves against gravity  -Left Upper Ext: responds and moves to nail bed pressure but lorie so than the right    -Right Lower Ext: Spontaneously moves against gravity  -Left Lower Ext:    responds and moves to nail bed pressure but lorie so than the right    DTR:  -Right   Bicep: 2+ Triceps: 2+ Brachioradialis: 2+   Patella: 2+ Ankle: 2+  Babinski  -Left   Bicep: 2+ Triceps: 2+ Brachioradialis: 2+   Patella: 2+ Ankle: 2+  Babinski    Sensory:  -Responds to nail bed pressure (propofol stopped but still in effect)    Coordination:  Unable to test as not following  commands    Gait  -Unable to test--intubated      Results Review:  Lab Results (last 7 days)     Procedure Component Value Units Date/Time    Troponin [[id number]]  (Abnormal) Collected:  11/21/18 0905    Specimen:  Blood from Arm, Left Updated:  11/21/18 0958     Troponin I 0.040 ng/mL     Blood Gas, Arterial [[id number]]  (Abnormal) Collected:  11/21/18 0947    Specimen:  Arterial Blood Updated:  11/21/18 0952     Site Right Radial     Per's Test Positive     pH, Arterial 7.323 pH units      Comment: 84 Value below reference range        pCO2, Arterial 44.2 mm Hg      pO2, Arterial 223.0 mm Hg      Comment: 83 Value above reference range        HCO3, Arterial 22.9 mmol/L      Base Excess, Arterial -3.3 mmol/L      Comment: 84 Value below reference range        O2 Saturation, Arterial 100.0 %      Comment: 83 Value above reference range        Temperature 37.0 C      Barometric Pressure for Blood Gas 760 mmHg      Modality Ventilator     FIO2 60 %      Ventilator Mode AC     Set Tidal Volume 700     Set Mech Resp Rate 10.0     PEEP 5.0     Collected by [id number]     Comment: Meter: [id number]     :  [id number]       Comprehensive Metabolic Panel [[id number]]  (Abnormal) Collected:  11/21/18 0905    Specimen:  Blood from Arm, Left Updated:  11/21/18 0947     Glucose 157 mg/dL      BUN 14 mg/dL      Creatinine 1.67 mg/dL      Sodium 143 mmol/L      Potassium 4.1 mmol/L      Chloride 103 mmol/L      CO2 27.0 mmol/L      Calcium 9.4 mg/dL      Total Protein 7.7 g/dL      Albumin 4.40 g/dL      ALT (SGPT) 19 U/L      AST (SGOT) 29 U/L      Alkaline Phosphatase 79 U/L      Total Bilirubin 0.7 mg/dL      eGFR Non African Amer 39 mL/min/1.73      Globulin 3.3 gm/dL      A/G Ratio 1.3 g/dL      BUN/Creatinine Ratio 8.4     Anion Gap 13.0 mmol/L     Narrative:       The MDRD GFR formula is only valid for adults with stable renal function between ages 18 and 70.    CBC & Differential [[id number]] Collected:  11/21/18  0905    Specimen:  Blood Updated:  11/21/18 0945    Narrative:       The following orders were created for panel order CBC & Differential.  Procedure                               Abnormality         Status                     ---------                               -----------         ------                     Manual Differential[[id number]]                                                         CBC Auto Differential[[id number]]        Abnormal            Final result                 Please view results for these tests on the individual orders.    CBC Auto Differential [[id number]]  (Abnormal) Collected:  11/21/18 0905    Specimen:  Blood from Arm, Left Updated:  11/21/18 0945     WBC 4.62 10*3/mm3      RBC 5.64 10*6/mm3      Hemoglobin 14.2 g/dL      Hematocrit 48.0 %      MCV 85.1 fL      MCH 25.2 pg      MCHC 29.6 g/dL      RDW 21.7 %      RDW-SD 66.4 fl      MPV 9.7 fL      Platelets 112 10*3/mm3      Neutrophil % 59.4 %      Lymphocyte % 28.6 %      Monocyte % 6.9 %      Eosinophil % 4.1 %      Basophil % 0.6 %      Immature Grans % 0.4 %      Neutrophils, Absolute 2.74 10*3/mm3      Lymphocytes, Absolute 1.32 10*3/mm3      Monocytes, Absolute 0.32 10*3/mm3      Eosinophils, Absolute 0.19 10*3/mm3      Basophils, Absolute 0.03 10*3/mm3      Immature Grans, Absolute 0.02 10*3/mm3      nRBC 0.0 /100 WBC     Protime-INR [[id number]]  (Abnormal) Collected:  11/21/18 0905    Specimen:  Blood from Arm, Left Updated:  11/21/18 0943     Protime 14.7 Seconds      INR 1.11    aPTT [[id number]]  (Normal) Collected:  11/21/18 0905    Specimen:  Blood from Arm, Left Updated:  11/21/18 0943     PTT 34.4 seconds        .  Imaging Results (last 24 hours)     Procedure Component Value Units Date/Time    CT Angiogram Head With & Without Contrast [[id number]] Collected:  11/21/18 1046     Updated:  11/21/18 1059    Narrative:          History:  88-year-old with stroke. Decreased alertness.     Reference   Correlation with CT head  same day.     Technique  Thin slice helical scanning of the brain is performed post intravenous  contrast administration for evaluation of the intracranial arterial  vasculature. Multiple MIP reformations are obtained. Automated exposure  control was utilized to limit radiation dose.  mGy-cm.     Findings  There is occlusion of the right M1 MCA. The right M2 MCA branches are  reconstituted via luz collaterals. The right intracranial ICA shows no  opacification which could be due to additional thrombus or altered flow  dynamics due to the upstream MCA clot.     The right GRANT is normally opacified via patent anterior communicating  artery. Left GRANT and left MCA are normal. Atherosclerotic plaquing of  the internal carotid arteries.     The posterior circulation is unremarkable. No right posterior  communicating artery is identified.          Impression:       1. Occlusion of the right M1 MCA with reconstituted M2 branches.  2. Absent flow in the right intracranial ICA could be due to additional  thrombus or altered flow dynamics.     Neurologist aware of these findings at time of dictation.  This report was finalized on 11/21/2018 10:56 by Dr Olman Mei, .    CT Angiogram Neck With & Without Contrast [[id number]] Resulted:  11/21/18 1056     Updated:  11/21/18 1031    CT Cervical Spine Without Contrast [[id number]] Collected:  11/21/18 0947     Updated:  11/21/18 0956    Narrative:       EXAMINATION:  CT CERVICAL SPINE WO CONTRAST-  11/21/2018 9:10 AM CST     HISTORY: The patient fell. The patient is unresponsive. Rule out  cervical spine fracture.     TECHNIQUE: Spiral CT was performed of the cervical spine. Sagittal and  coronal images were reconstructed.     DLP: 444 mGy-cm. Automated dosage control was utilized.     COMPARISON: No comparison study.     FINDINGS: There is atheromatous calcification of the carotid arteries in  the neck bilaterally. There is pleural effusion on the right on the  images of the  upper lungs. The patient's head is significantly rotated  to the right. The patient is intubated. No definite cervical spine  fracture is identified. C1 is rotated on C2 due to the patient having  his head rotated to the right side.     At C2-3, there is facet arthropathy on the right and mild uncinate  spurring. There is mild right-sided foraminal narrowing.     At C3-4, there is minimal anterior subluxation of C3 compared to C4 due  to facet arthropathy. There is moderate spondylitic and uncinate  spurring producing dural sac compression. There is severe bilateral  foraminal stenosis at this level.     At C4-5, there is disc narrowing with spondylitic and uncinate spurring.  The facet joints are fairly well-maintained. There is moderate to severe  bilateral foraminal stenosis.     At C5-6, there is disc narrowing with spondylitic and uncinate spurring.  There is severe left-sided foraminal stenosis and mild to moderate  right-sided foraminal narrowing. There is only minimal central spinal  canal narrowing.     At C6/7, there is disc narrowing with spondylitic and uncinate spurring.  There is moderate bilateral foraminal narrowing. There is no significant  central spinal canal stenosis.       Impression:       1. The patient's head is rotated to the right which causes rotation of  C1 on C2.  2. No definite cervical spine fracture is identified.  3. Degenerative changes, as described above.  4. Atheromatous calcification of the carotid arteries in the neck.  5. Pleural effusion on the right.     The full report of this exam was immediately signed and available to the  emergency room. The patient is currently in the emergency room.     This report was finalized on 11/21/2018 09:52 by Dr. Vasu Owens MD.    XR Chest 1 View [[id number]] Collected:  11/21/18 0946     Updated:  11/21/18 0951    Narrative:       History:  88-year-old with stroke.     Reference:  Chest radiograph March 2015     Findings:  Frontal chest  radiograph performed.     Cardiomegaly. Atherosclerotic thoracic aorta. Edema-like opacities  bilaterally. Question a tiny right pleural effusion. No pneumothorax.     Well-positioned endotracheal tube, tip 3.3 cm above the robby.  Degenerative changes throughout the spine.          Impression:       Congestive heart failure.  This report was finalized on 11/21/2018 09:48 by Dr Olman Mei, .    CT Facial Bones Without Contrast [[id number]] Collected:  11/21/18 0934     Updated:  11/21/18 0950    Narrative:       EXAMINATION:  CT FACIAL BONES WO CONTRAST-  11/21/2018 9:10 AM CST     HISTORY: The patient fell. Rule out facial bone fracture.      COMPARISON: No comparison study.     TECHNIQUE: CT was performed of the facial bones. Sagittal and coronal  images were reconstructed. DLP: 394 mGy-cm.     FINDINGS: The study is degraded by significant motion artifact. There is  no visible facial bone fracture. There are no air-fluid levels in the  paranasal sinuses. The patient is intubated.       Impression:       1. Study degraded significantly by motion.  2. No definite facial bone fracture identified.     The full report of this exam was immediately signed and available to the  emergency room. The patient is currently in the emergency room.  This report was finalized on 11/21/2018 09:47 by Dr. Vasu Owens MD.    CT Head Without Contrast Stroke Protocol [[id number]] Collected:  11/21/18 0931     Updated:  11/21/18 0948    Narrative:       History:  88-year-old with stroke. Fall and unresponsiveness.     Reference:  None.     Technique:   Unenhanced CT imaging of the head/brain performed.     For this CT exam, one or more of the following dose reduction techniques  was employed:  -automated exposure control  -mA and/or kVp adjustment for patient size  -iterative reconstruction      mGy-cm     Findings:   There is motion degradation particularly through the anterior cranial  fossa.     No CT findings of acute  infarction. There is equivocal hyperdensity of  the right M1 MCA compared to the left. There is obscuration of the  gray-white matter interface of the right insula. The right basal ganglia  is less distinct than the left.      Partially calcified mass along the posterior left falx measures 2.2 cm  in craniocaudal dimension by 2.1 cm in AP dimension by 1.7 cm in  transverse dimension. Appearance suggests meningioma. No  intraparenchymal masses. Moderate chronic small vessel ischemia of the  periventricular white matter. No hydrocephalus. The suprasellar cistern  is clear. Prominent retrocerebellar CSF space, anatomic variation.     The partially imaged paranasal sinuses are clear. Unclear if mastoid air  cells are congenitally hypoplastic or patient has had mastoidectomies.          Impression:          1. Equivocal hyperdense right M1 MCA (series 3 image 7, series 5 image  21). Suggest suggest CT angiography of the brain to assess patency of  the right MCA.  2. Obscuration of the right insula gray-white matter may suggest early  infarct.  3. Posterior left parafalcine meningioma incidentally noted.     Critical results discussed with Dr. Murcia at 9:36 AM on today  This report was finalized on 11/21/2018 09:45 by Dr Olman Mei, .              Impression    1. 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 No TPA as last known well was outside TPA window and he is on Eliquis and he sustained head trauma  3. Atrial fibrillation s/p cardioversion 11/19 but now back in atrial fib  4. DM  5 Hyperlipidemia  6. Likely has meningioma (calcified mass) based on Head CT    Plan  Spoke with family regarding the results and they are in agreement with transfer to [facility].   [facility] transfer for possible thrombectomy--they have kindly agreed to take this patient    I discussed the patients findings and my recommendations with patient, family, nursing staff and physicians and transfer center at [facility]        ADDENDUM:  Preliminary reading: Running EEG does not show any significant abnormalities.  No epileptiform discharges. Finally reading will be done will tech can load into our system to be officially read.     Will make sure [place] has family contact information.    Patricia Canales MD  11/21/18  10:59 AM

## 2019-01-19 NOTE — ED PROVIDER NOTES
Subjective   wpke up confused fell and decreased loc         Stroke   Presenting symptoms: change in consciousness, confusion, loss of balance and weakness    Onset quality:  Sudden  Timing:  Constant  Progression:  Worsening  Similar to previous episodes: no    Associated symptoms: fall and seizures    Associated symptoms: no chest pain, no nausea, no neck pain, no vertigo and no vomiting        Review of Systems   Unable to perform ROS: Intubated   Cardiovascular: Negative for chest pain.   Gastrointestinal: Negative for nausea and vomiting.   Musculoskeletal: Negative for neck pain.   Neurological: Positive for seizures, weakness and loss of balance. Negative for vertigo.   Psychiatric/Behavioral: Positive for confusion.       Past Medical History:   Diagnosis Date   • Atrial fibrillation (CMS/HCC)    • Diabetes mellitus (CMS/HCC)    • Disease of thyroid gland     HYPOTHYROIDISM   • Hyperlipidemia        No Known Allergies    Past Surgical History:   Procedure Laterality Date   • CYSTOSCOPY, STENT INSERTION, NEPHROURETERECTOMY         Family History   Problem Relation Age of Onset   • No Known Problems Mother    • No Known Problems Father    • No Known Problems Sister    • No Known Problems Brother    • No Known Problems Brother    • No Known Problems Brother    • No Known Problems Brother    • No Known Problems Brother        Social History     Socioeconomic History   • Marital status:      Spouse name: Not on file   • Number of children: Not on file   • Years of education: Not on file   • Highest education level: Not on file   Tobacco Use   • Smoking status: Former Smoker     Last attempt to quit:      Years since quittin.9   • Smokeless tobacco: Never Used   • Tobacco comment: QUIT 50 YRS AGO   Substance and Sexual Activity   • Alcohol use: No   • Drug use: No   • Sexual activity: Defer           Objective   Physical Exam   Constitutional: He appears toxic. Nasal cannula in place.   HENT:    Head: Normocephalic and atraumatic.   Eyes: Lids are normal. Right eye exhibits abnormal extraocular motion. Left eye exhibits abnormal extraocular motion. Right pupil is not reactive. Left pupil is not reactive.   Neck: Trachea normal. Carotid bruit is not present.   Cardiovascular: Regular rhythm and normal pulses.  Extrasystoles are present. PMI is not displaced.   Pulmonary/Chest: Bradypnea noted. He is in respiratory distress. He has decreased breath sounds in the right lower field and the left lower field.   Abdominal: Normal appearance and normal aorta. He exhibits no distension and no ascites. Bowel sounds are decreased.   Neurological: He is disoriented and unresponsive. No cranial nerve deficit. He exhibits abnormal muscle tone. GCS eye subscore is 4. GCS verbal subscore is 1. GCS motor subscore is 2.   Reflex Scores:       Bicep reflexes are 2+ on the right side and 2+ on the left side.       Patellar reflexes are 2+ on the right side and 2+ on the left side.      Intubation  Date/Time: 11/21/2018 9:19 AM  Performed by: Shai Turk MD  Authorized by: Shai Turk MD     Consent:     Consent obtained:  Emergent situation    Risks discussed:  Aspiration, brain injury, death, bleeding, hypoxia, dental trauma, laryngeal injury and pneumothorax    Alternatives discussed:  No treatment  Pre-procedure details:     Patient status:  Unresponsive    Mallampati score:  II    Pretreatment medications:  Lidocaine    Paralytics:  Succinylcholine  Procedure details:     Preoxygenation:  Bag valve mask    CPR in progress: no      Intubation method:  Oral    Oral intubation technique:  Video-assisted    Laryngoscope blade:  Mac 3    Tube size (mm):  7.5    Tube type:  Cuffed    Number of attempts:  1    Ventilation between attempts: no      Cricoid pressure: no      Tube visualized through cords: yes    Placement assessment:     Tube secured with:  ETT corbin    Breath sounds:  Equal    Placement verification:  chest rise, CXR verification, ETCO2 detector and fiberoptic scope      CXR findings:  ETT in proper place  Post-procedure details:     Patient tolerance of procedure:  Tolerated well, no immediate complications               ED Course  ED Course as of Nov 21 1111 Wed Nov 21, 2018   1108 he patient was brought by EMS unresponsive on the 2 L of nasal cannula posturing fixed deviated gaze to the right with no purposeful movement was emergently intubated to secure airway sent for CTs CTA shows a right MCA infarct  was consulted and came and saw the patient immediately in the ED and evaluated the case and after discussing the family the patient's was transferred to [place] for clot extraction and evacuation transfer was initiated and completed by the neurologist    [TS]   1111 Patient was given Keppra with a questionable possibility of seizures  [TS]      ED Course User Index  [TS] Shai Turk MD                  MDM  Number of Diagnoses or Management Options  Diagnosis management comments: Differential Diagnosis:  I considered toxic-metabolic etiology, hypoglycemia, hyperglycemia, diabetic ketoacidosis, drug overdose, ethanol intoxication, thiamine deficiency, hypothermia, hyponatremia, hypernatremia, organ failure, liver failure, kidney failure, thyroid failure, adrenal failure, hypoxia, hypercarbia, ischemic stroke, intracranial bleed, subarachnoid hemorrhage, closed head injury, subdural hematoma, seizure activity, syncopal episode, infectious etiology, hypertensive encephalopathy, vasculitis, thrombotic thrombocytopenic purpura and disseminated intravascular coagulation as a possible cause of altered mental status in this patient. This is a partial list of diagnoses considered.              Amount and/or Complexity of Data Reviewed  Clinical lab tests: ordered and reviewed  Tests in the radiology section of CPT®: ordered and reviewed  Tests in the medicine section of CPT®: ordered and  reviewed  Obtain history from someone other than the patient: yes    Risk of Complications, Morbidity, and/or Mortality  Presenting problems: high  Diagnostic procedures: high  Management options: high          Final diagnoses:   Cerebrovascular accident (CVA) due to occlusion of right middle cerebral artery (CMS/HCC)   HEIDE (acute kidney injury) (CMS/HCC)   Altered mental status, unspecified altered mental status type   Contusion of scalp, initial encounter   Troponin level elevated            Shai Turk MD  11/21/18 1111     22:35